# Patient Record
Sex: FEMALE | Race: WHITE
[De-identification: names, ages, dates, MRNs, and addresses within clinical notes are randomized per-mention and may not be internally consistent; named-entity substitution may affect disease eponyms.]

---

## 2019-03-05 ENCOUNTER — HOSPITAL ENCOUNTER (EMERGENCY)
Dept: HOSPITAL 92 - ERS | Age: 19
Discharge: HOME | End: 2019-03-05
Payer: SELF-PAY

## 2019-03-05 DIAGNOSIS — N94.6: Primary | ICD-10-CM

## 2019-03-05 DIAGNOSIS — F17.210: ICD-10-CM

## 2019-03-05 DIAGNOSIS — F41.9: ICD-10-CM

## 2019-03-05 LAB
BASOPHILS # BLD AUTO: 0.1 THOU/UL (ref 0–0.2)
BASOPHILS NFR BLD AUTO: 1.2 % (ref 0–1)
EOSINOPHIL # BLD AUTO: 0.1 THOU/UL (ref 0–0.7)
EOSINOPHIL NFR BLD AUTO: 1.5 % (ref 0–10)
HGB BLD-MCNC: 16.6 G/DL (ref 12–16)
LYMPHOCYTES # BLD: 2.6 THOU/UL (ref 1.2–3.4)
LYMPHOCYTES NFR BLD AUTO: 32.6 % (ref 28–48)
MCH RBC QN AUTO: 32.8 PG (ref 25–35)
MCV RBC AUTO: 95.4 FL (ref 78–98)
MONOCYTES # BLD AUTO: 0.6 THOU/UL (ref 0.11–0.59)
MONOCYTES NFR BLD AUTO: 7.1 % (ref 0–4)
NEUTROPHILS # BLD AUTO: 4.6 THOU/UL (ref 1.4–6.5)
NEUTROPHILS NFR BLD AUTO: 57.7 % (ref 31–61)
PLATELET # BLD AUTO: 309 THOU/UL (ref 130–400)
RBC # BLD AUTO: 5.06 MILL/UL (ref 4–5.2)
WBC # BLD AUTO: 7.9 THOU/UL (ref 4.8–10.8)

## 2019-03-05 PROCEDURE — 36415 COLL VENOUS BLD VENIPUNCTURE: CPT

## 2019-03-05 PROCEDURE — 84702 CHORIONIC GONADOTROPIN TEST: CPT

## 2019-03-05 PROCEDURE — 85025 COMPLETE CBC W/AUTO DIFF WBC: CPT

## 2019-03-05 PROCEDURE — 96372 THER/PROPH/DIAG INJ SC/IM: CPT

## 2019-10-15 ENCOUNTER — HOSPITAL ENCOUNTER (EMERGENCY)
Dept: HOSPITAL 92 - ERS | Age: 19
Discharge: HOME | End: 2019-10-15
Payer: SELF-PAY

## 2019-10-15 DIAGNOSIS — F41.9: ICD-10-CM

## 2019-10-15 DIAGNOSIS — L03.113: Primary | ICD-10-CM

## 2019-10-15 DIAGNOSIS — Z85.028: ICD-10-CM

## 2019-10-15 DIAGNOSIS — K21.9: ICD-10-CM

## 2019-10-15 DIAGNOSIS — F17.210: ICD-10-CM

## 2019-10-15 PROCEDURE — 99282 EMERGENCY DEPT VISIT SF MDM: CPT

## 2019-12-30 ENCOUNTER — HOSPITAL ENCOUNTER (EMERGENCY)
Dept: HOSPITAL 92 - ERS | Age: 19
Discharge: HOME | End: 2019-12-30
Payer: COMMERCIAL

## 2019-12-30 DIAGNOSIS — Z3A.01: ICD-10-CM

## 2019-12-30 DIAGNOSIS — F41.9: ICD-10-CM

## 2019-12-30 DIAGNOSIS — F17.210: ICD-10-CM

## 2019-12-30 DIAGNOSIS — O99.331: ICD-10-CM

## 2019-12-30 DIAGNOSIS — F15.10: ICD-10-CM

## 2019-12-30 DIAGNOSIS — O99.341: ICD-10-CM

## 2019-12-30 DIAGNOSIS — O23.41: Primary | ICD-10-CM

## 2019-12-30 LAB
ALBUMIN SERPL BCG-MCNC: 4.7 G/DL (ref 3.5–5)
ALP SERPL-CCNC: 73 U/L (ref 40–100)
ALT SERPL W P-5'-P-CCNC: 14 U/L (ref 8–55)
ANION GAP SERPL CALC-SCNC: 13 MMOL/L (ref 10–20)
APAP SERPL-MCNC: (no result) MCG/ML (ref 10–30)
AST SERPL-CCNC: 14 U/L (ref 5–34)
BACTERIA UR QL AUTO: (no result) HPF
BASOPHILS # BLD AUTO: 0.1 THOU/UL (ref 0–0.2)
BASOPHILS NFR BLD AUTO: 0.7 % (ref 0–1)
BILIRUB SERPL-MCNC: 0.4 MG/DL (ref 0.2–1.2)
BUN SERPL-MCNC: 8 MG/DL (ref 7–18.7)
CALCIUM SERPL-MCNC: 9.6 MG/DL (ref 7.8–10.44)
CHLORIDE SERPL-SCNC: 103 MMOL/L (ref 98–107)
CK SERPL-CCNC: 90 U/L (ref 29–168)
CO2 SERPL-SCNC: 24 MMOL/L (ref 22–29)
CREAT CL PREDICTED SERPL C-G-VRATE: 0 ML/MIN (ref 70–130)
DRUG SCREEN CUTOFF: (no result)
EOSINOPHIL # BLD AUTO: 0 THOU/UL (ref 0–0.7)
EOSINOPHIL NFR BLD AUTO: 0.1 % (ref 0–10)
GLOBULIN SER CALC-MCNC: 3.1 G/DL (ref 2.4–3.5)
GLUCOSE SERPL-MCNC: 108 MG/DL (ref 70–105)
GLUCOSE UR STRIP-MCNC: 300 MG/DL
HCG SERPL QL: POSITIVE
HGB BLD-MCNC: 16.1 G/DL (ref 12–16)
LEUKOCYTE ESTERASE UR QL STRIP.AUTO: 75 LEU/UL
LYMPHOCYTES # BLD: 1.6 THOU/UL (ref 1.2–3.4)
LYMPHOCYTES NFR BLD AUTO: 10.6 % (ref 28–48)
MCH RBC QN AUTO: 33 PG (ref 25–35)
MCV RBC AUTO: 93.7 FL (ref 78–98)
MEDTOX CONTROL LINE VALID?: (no result)
MEDTOX READER #: (no result)
MONOCYTES # BLD AUTO: 0.9 THOU/UL (ref 0.11–0.59)
MONOCYTES NFR BLD AUTO: 6.1 % (ref 0–4)
NEUTROPHILS # BLD AUTO: 12.5 THOU/UL (ref 1.4–6.5)
NEUTROPHILS NFR BLD AUTO: 82.5 % (ref 31–61)
PLATELET # BLD AUTO: 312 THOU/UL (ref 130–400)
POTASSIUM SERPL-SCNC: 3.6 MMOL/L (ref 3.5–5.1)
PREGS CONTROL BACKGROUND?: (no result)
PREGS CONTROL BAR APPEAR?: YES
PROT UR STRIP.AUTO-MCNC: 10 MG/DL
RBC # BLD AUTO: 4.87 MILL/UL (ref 4–5.2)
RBC UR QL AUTO: (no result) HPF (ref 0–3)
SALICYLATES SERPL-MCNC: (no result) MG/DL (ref 15–30)
SODIUM SERPL-SCNC: 136 MMOL/L (ref 136–145)
WBC # BLD AUTO: 15.2 THOU/UL (ref 4.8–10.8)

## 2019-12-30 PROCEDURE — 81015 MICROSCOPIC EXAM OF URINE: CPT

## 2019-12-30 PROCEDURE — 80306 DRUG TEST PRSMV INSTRMNT: CPT

## 2019-12-30 PROCEDURE — 81003 URINALYSIS AUTO W/O SCOPE: CPT

## 2019-12-30 PROCEDURE — 80053 COMPREHEN METABOLIC PANEL: CPT

## 2019-12-30 PROCEDURE — 96361 HYDRATE IV INFUSION ADD-ON: CPT

## 2019-12-30 PROCEDURE — 84484 ASSAY OF TROPONIN QUANT: CPT

## 2019-12-30 PROCEDURE — 84703 CHORIONIC GONADOTROPIN ASSAY: CPT

## 2019-12-30 PROCEDURE — 84443 ASSAY THYROID STIM HORMONE: CPT

## 2019-12-30 PROCEDURE — 87077 CULTURE AEROBIC IDENTIFY: CPT

## 2019-12-30 PROCEDURE — 80307 DRUG TEST PRSMV CHEM ANLYZR: CPT

## 2019-12-30 PROCEDURE — 87086 URINE CULTURE/COLONY COUNT: CPT

## 2019-12-30 PROCEDURE — 93005 ELECTROCARDIOGRAM TRACING: CPT

## 2019-12-30 PROCEDURE — 82550 ASSAY OF CK (CPK): CPT

## 2019-12-30 PROCEDURE — 87186 SC STD MICRODIL/AGAR DIL: CPT

## 2019-12-30 PROCEDURE — 96360 HYDRATION IV INFUSION INIT: CPT

## 2019-12-30 PROCEDURE — 85025 COMPLETE CBC W/AUTO DIFF WBC: CPT

## 2020-02-01 ENCOUNTER — HOSPITAL ENCOUNTER (EMERGENCY)
Dept: HOSPITAL 92 - ERS | Age: 20
Discharge: HOME | End: 2020-02-01
Payer: SELF-PAY

## 2020-02-01 DIAGNOSIS — Z3A.09: ICD-10-CM
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
## 2020-02-01 DIAGNOSIS — F17.210: ICD-10-CM

## 2020-02-01 DIAGNOSIS — O99.341: ICD-10-CM

## 2020-02-01 DIAGNOSIS — O99.331: ICD-10-CM

## 2020-02-01 DIAGNOSIS — O9A.311: Primary | ICD-10-CM

## 2020-02-01 DIAGNOSIS — F41.9: ICD-10-CM

## 2020-02-01 LAB
ALBUMIN SERPL BCG-MCNC: 3.8 G/DL (ref 3.5–5)
ALP SERPL-CCNC: 50 U/L (ref 40–100)
ALT SERPL W P-5'-P-CCNC: 16 U/L (ref 8–55)
ANION GAP SERPL CALC-SCNC: 14 MMOL/L (ref 10–20)
AST SERPL-CCNC: 16 U/L (ref 5–34)
BACTERIA UR QL AUTO: (no result) HPF
BASOPHILS # BLD AUTO: 0 THOU/UL (ref 0–0.2)
BASOPHILS NFR BLD AUTO: 0 % (ref 0–1)
BILIRUB SERPL-MCNC: 1.2 MG/DL (ref 0.2–1.2)
BUN SERPL-MCNC: 6 MG/DL (ref 7–18.7)
CALCIUM SERPL-MCNC: 8.7 MG/DL (ref 7.8–10.44)
CHLORIDE SERPL-SCNC: 103 MMOL/L (ref 98–107)
CO2 SERPL-SCNC: 21 MMOL/L (ref 22–29)
CREAT CL PREDICTED SERPL C-G-VRATE: 0 ML/MIN (ref 70–130)
DRUG SCREEN CUTOFF: (no result)
EOSINOPHIL # BLD AUTO: 0 THOU/UL (ref 0–0.7)
EOSINOPHIL NFR BLD AUTO: 0.1 % (ref 0–10)
GLOBULIN SER CALC-MCNC: 2.8 G/DL (ref 2.4–3.5)
GLUCOSE SERPL-MCNC: 110 MG/DL (ref 70–105)
HGB BLD-MCNC: 13.6 G/DL (ref 12–16)
LEUKOCYTE ESTERASE UR QL STRIP.AUTO: 500 LEU/UL
LYMPHOCYTES # BLD: 0.4 THOU/UL (ref 1.2–3.4)
LYMPHOCYTES NFR BLD AUTO: 2.4 % (ref 28–48)
MCH RBC QN AUTO: 34 PG (ref 25–35)
MCV RBC AUTO: 95 FL (ref 78–98)
MEDTOX CONTROL LINE VALID?: (no result)
MEDTOX READER #: (no result)
MONOCYTES # BLD AUTO: 0.6 THOU/UL (ref 0.11–0.59)
MONOCYTES NFR BLD AUTO: 3.3 % (ref 0–4)
NEUTROPHILS # BLD AUTO: 16.5 THOU/UL (ref 1.4–6.5)
NEUTROPHILS NFR BLD AUTO: 94.2 % (ref 31–61)
PLATELET # BLD AUTO: 197 THOU/UL (ref 130–400)
POTASSIUM SERPL-SCNC: 3.6 MMOL/L (ref 3.5–5.1)
PROT UR STRIP.AUTO-MCNC: 30 MG/DL
RBC # BLD AUTO: 4.01 MILL/UL (ref 4–5.2)
SODIUM SERPL-SCNC: 134 MMOL/L (ref 136–145)
WBC # BLD AUTO: 17.5 THOU/UL (ref 4.8–10.8)

## 2020-02-01 PROCEDURE — 96361 HYDRATE IV INFUSION ADD-ON: CPT

## 2020-02-01 PROCEDURE — 87086 URINE CULTURE/COLONY COUNT: CPT

## 2020-02-01 PROCEDURE — 76856 US EXAM PELVIC COMPLETE: CPT

## 2020-02-01 PROCEDURE — 81003 URINALYSIS AUTO W/O SCOPE: CPT

## 2020-02-01 PROCEDURE — 71046 X-RAY EXAM CHEST 2 VIEWS: CPT

## 2020-02-01 PROCEDURE — 80053 COMPREHEN METABOLIC PANEL: CPT

## 2020-02-01 PROCEDURE — 84702 CHORIONIC GONADOTROPIN TEST: CPT

## 2020-02-01 PROCEDURE — 93005 ELECTROCARDIOGRAM TRACING: CPT

## 2020-02-01 PROCEDURE — 81015 MICROSCOPIC EXAM OF URINE: CPT

## 2020-02-01 PROCEDURE — 80306 DRUG TEST PRSMV INSTRMNT: CPT

## 2020-02-01 PROCEDURE — 85025 COMPLETE CBC W/AUTO DIFF WBC: CPT

## 2020-02-01 PROCEDURE — 36415 COLL VENOUS BLD VENIPUNCTURE: CPT

## 2020-02-01 PROCEDURE — 87186 SC STD MICRODIL/AGAR DIL: CPT

## 2020-02-01 PROCEDURE — 96374 THER/PROPH/DIAG INJ IV PUSH: CPT

## 2020-02-01 PROCEDURE — 87077 CULTURE AEROBIC IDENTIFY: CPT
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
## 2020-05-17 ENCOUNTER — HOSPITAL ENCOUNTER (INPATIENT)
Dept: HOSPITAL 92 - ERS | Age: 20
LOS: 1 days | Discharge: ANOTHER HOSPITAL | DRG: 832 | End: 2020-05-18
Attending: OBSTETRICS & GYNECOLOGY | Admitting: OBSTETRICS & GYNECOLOGY
Payer: COMMERCIAL

## 2020-05-17 VITALS — BODY MASS INDEX: 23.2 KG/M2

## 2020-05-17 DIAGNOSIS — O36.5920: ICD-10-CM

## 2020-05-17 DIAGNOSIS — O14.13: Primary | ICD-10-CM

## 2020-05-17 DIAGNOSIS — F15.90: ICD-10-CM

## 2020-05-17 DIAGNOSIS — O35.8XX0: ICD-10-CM

## 2020-05-17 DIAGNOSIS — Z3A.24: ICD-10-CM

## 2020-05-17 DIAGNOSIS — O99.322: ICD-10-CM

## 2020-05-17 DIAGNOSIS — O41.02X0: ICD-10-CM

## 2020-05-17 DIAGNOSIS — Z11.59: ICD-10-CM

## 2020-05-17 LAB
DRUG SCREEN CUTOFF: (no result)
LEUKOCYTE ESTERASE UR QL STRIP.AUTO: 75 LEU/UL
MEDTOX CONTROL LINE VALID?: (no result)
MEDTOX READER #: (no result)
PROT UR STRIP.AUTO-MCNC: 30 MG/DL
RBC UR QL AUTO: (no result) HPF (ref 0–3)
WBC UR QL AUTO: (no result) HPF (ref 0–3)

## 2020-05-17 PROCEDURE — 84484 ASSAY OF TROPONIN QUANT: CPT

## 2020-05-17 PROCEDURE — 87635 SARS-COV-2 COVID-19 AMP PRB: CPT

## 2020-05-17 PROCEDURE — 85379 FIBRIN DEGRADATION QUANT: CPT

## 2020-05-17 PROCEDURE — 87340 HEPATITIS B SURFACE AG IA: CPT

## 2020-05-17 PROCEDURE — 71045 X-RAY EXAM CHEST 1 VIEW: CPT

## 2020-05-17 PROCEDURE — 84156 ASSAY OF PROTEIN URINE: CPT

## 2020-05-17 PROCEDURE — 81003 URINALYSIS AUTO W/O SCOPE: CPT

## 2020-05-17 PROCEDURE — 85610 PROTHROMBIN TIME: CPT

## 2020-05-17 PROCEDURE — 80306 DRUG TEST PRSMV INSTRMNT: CPT

## 2020-05-17 PROCEDURE — 82550 ASSAY OF CK (CPK): CPT

## 2020-05-17 PROCEDURE — 86780 TREPONEMA PALLIDUM: CPT

## 2020-05-17 PROCEDURE — 82570 ASSAY OF URINE CREATININE: CPT

## 2020-05-17 PROCEDURE — 82553 CREATINE MB FRACTION: CPT

## 2020-05-17 PROCEDURE — 76805 OB US >/= 14 WKS SNGL FETUS: CPT

## 2020-05-17 PROCEDURE — 96375 TX/PRO/DX INJ NEW DRUG ADDON: CPT

## 2020-05-17 PROCEDURE — 36415 COLL VENOUS BLD VENIPUNCTURE: CPT

## 2020-05-17 PROCEDURE — U0003 INFECTIOUS AGENT DETECTION BY NUCLEIC ACID (DNA OR RNA); SEVERE ACUTE RESPIRATORY SYNDROME CORONAVIRUS 2 (SARS-COV-2) (CORONAVIRUS DISEASE [COVID-19]), AMPLIFIED PROBE TECHNIQUE, MAKING USE OF HIGH THROUGHPUT TECHNOLOGIES AS DESCRIBED BY CMS-2020-01-R: HCPCS

## 2020-05-17 PROCEDURE — 85025 COMPLETE CBC W/AUTO DIFF WBC: CPT

## 2020-05-17 PROCEDURE — 84112 EVAL AMNIOTIC FLUID PROTEIN: CPT

## 2020-05-17 PROCEDURE — 86850 RBC ANTIBODY SCREEN: CPT

## 2020-05-17 PROCEDURE — 81015 MICROSCOPIC EXAM OF URINE: CPT

## 2020-05-17 PROCEDURE — 96374 THER/PROPH/DIAG INJ IV PUSH: CPT

## 2020-05-17 PROCEDURE — 85730 THROMBOPLASTIN TIME PARTIAL: CPT

## 2020-05-17 PROCEDURE — 80053 COMPREHEN METABOLIC PANEL: CPT

## 2020-05-17 PROCEDURE — 86901 BLOOD TYPING SEROLOGIC RH(D): CPT

## 2020-05-17 PROCEDURE — 51702 INSERT TEMP BLADDER CATH: CPT

## 2020-05-17 PROCEDURE — 86900 BLOOD TYPING SEROLOGIC ABO: CPT

## 2020-05-17 PROCEDURE — 85027 COMPLETE CBC AUTOMATED: CPT

## 2020-05-17 PROCEDURE — 93005 ELECTROCARDIOGRAM TRACING: CPT

## 2020-05-18 VITALS — DIASTOLIC BLOOD PRESSURE: 114 MMHG | SYSTOLIC BLOOD PRESSURE: 168 MMHG

## 2020-05-18 LAB
ALBUMIN SERPL BCG-MCNC: 3.3 G/DL (ref 3.5–5)
ALP SERPL-CCNC: 74 U/L (ref 40–100)
ALT SERPL W P-5'-P-CCNC: 20 U/L (ref 8–55)
AMNISURE INTERNAL CONTROL QC: (no result)
ANION GAP SERPL CALC-SCNC: 13 MMOL/L (ref 10–20)
APTT PPP: 29 SEC (ref 22.9–36.1)
AST SERPL-CCNC: 19 U/L (ref 5–34)
BASOPHILS # BLD AUTO: 0.1 THOU/UL (ref 0–0.2)
BASOPHILS NFR BLD AUTO: 1 % (ref 0–1)
BILIRUB SERPL-MCNC: 0.3 MG/DL (ref 0.2–1.2)
BUN SERPL-MCNC: 12 MG/DL (ref 7–18.7)
CALCIUM SERPL-MCNC: 9.6 MG/DL (ref 7.8–10.44)
CHLORIDE SERPL-SCNC: 105 MMOL/L (ref 98–107)
CK MB SERPL-MCNC: 0.4 NG/ML (ref 0–6.6)
CK SERPL-CCNC: 22 U/L (ref 29–168)
CO2 SERPL-SCNC: 22 MMOL/L (ref 22–29)
CREAT CL PREDICTED SERPL C-G-VRATE: 0 ML/MIN (ref 70–130)
D DIMER PPP FEU-MCNC: 0.56 *MCG/ML (ref 0.27–0.43)
EOSINOPHIL # BLD AUTO: 0.1 THOU/UL (ref 0–0.7)
EOSINOPHIL NFR BLD AUTO: 0.5 % (ref 0–10)
GLOBULIN SER CALC-MCNC: 3.4 G/DL (ref 2.4–3.5)
GLUCOSE SERPL-MCNC: 81 MG/DL (ref 70–105)
HBSAG INDEX: 0.16 S/CO (ref 0–0.99)
HGB BLD-MCNC: 15.7 G/DL (ref 12–16)
HGB BLD-MCNC: 15.8 G/DL (ref 12–16)
INR PPP: 0.8
LYMPHOCYTES # BLD: 2 THOU/UL (ref 1.2–3.4)
LYMPHOCYTES NFR BLD AUTO: 18.2 % (ref 28–48)
MCH RBC QN AUTO: 33 PG (ref 25–35)
MCH RBC QN AUTO: 34.4 PG (ref 25–35)
MCV RBC AUTO: 95.7 FL (ref 78–98)
MCV RBC AUTO: 95.9 FL (ref 78–98)
MONOCYTES # BLD AUTO: 0.8 THOU/UL (ref 0.11–0.59)
MONOCYTES NFR BLD AUTO: 7.6 % (ref 0–4)
NEUTROPHILS # BLD AUTO: 7.8 THOU/UL (ref 1.4–6.5)
NEUTROPHILS NFR BLD AUTO: 72.7 % (ref 31–61)
PLATELET # BLD AUTO: 205 THOU/UL (ref 130–400)
PLATELET # BLD AUTO: 210 THOU/UL (ref 130–400)
POTASSIUM SERPL-SCNC: 4.1 MMOL/L (ref 3.5–5.1)
PROT UR-MCNC: 16 MG/DL (ref 1–14)
PROTHROMBIN TIME: 11.2 SEC (ref 12–14.7)
RBC # BLD AUTO: 4.57 MILL/UL (ref 4–5.2)
RBC # BLD AUTO: 4.78 MILL/UL (ref 4–5.2)
SODIUM SERPL-SCNC: 136 MMOL/L (ref 136–145)
SYPHILIS ANTIBODY INDEX: 0.04 S/CO
WBC # BLD AUTO: 10.8 THOU/UL (ref 4.8–10.8)
WBC # BLD AUTO: 12.2 THOU/UL (ref 4.8–10.8)

## 2020-05-18 NOTE — PRG
DATE OF SERVICE:  05/18/2020



TIME OF SERVICE:  07:30.



SUBJECTIVE:  Ms. Mtz has been resting comfortably through the night.  She required

a total of 15 mg of Apresoline to achieve persistent and sustained systolic blood

pressures below 160.  She states her chest pain is better, headache is better.  She

has some sweats from the magnesium sulfate.  She has a good urine output.  COVID-19

test is pending and anticipate resulting between 1100 hours and 1200 hours today. 



Protein creatinine ratio was 16/21 consistent with proteinuria.  Ultrasound revealed

a growth restricted fetus.  The patient has good gestational criteria for 24 weeks

and 4 days gestation.  Composite gestational age was 21 weeks 6 days with EFW of 388

g, SAVANNA of 2.4 cm and absent end-diastolic flow.  There was no reversal of

end-diastolic flow noted.  FHTs were 150s, cephalic presentation was noted.

AmniSure was negative.  This is a preliminary report based on ultrasound tech

biometrics and radiologist report is not available. 



IMPRESSION:  This is severe preeclampsia complicated with growth restriction and

umbilical artery dysfunction without evidence of acute fetal distress. 



PLAN:  Discussed the patient's care with on-call MFM at [facility].

He agrees with this plan.  We will continue magnesium sulfate and betamethasone per

protocol for severe preeclampsia and for prematurity.  Apresoline as needed.  Await

negative COVID-19 result which was performed solely because of the patient's

incarcerated status.  [facility] acknowledges that they will accept the patient with

negative COVID-19 status.  We will initiate this transfer with a transfer center at

[facility] and plan on air ambulance evacuation to [place] later this morning.  Dr. Kent will be taking over at 0800 and this plan of care will be discussed with

him. 







Job ID:  [id number]

## 2020-05-18 NOTE — PDOC.EVN
Event Note





- Event Note


Event Note: 





Ms. Mtz, who had been in the custody of [facility]'s office until 

now, has requested to leave against medical advice. She arrived with Blood 

pressures 180s systolic and has required multiple doses of hydralizine for 

control. She currently has a blood pressure 168/116 for which we have treated 

with hydralizine 5mg. We have had a very jesus discussion. She understands that 

her baby is extremely small for its age and that it has a placenta that is not 

working right.  She has expressed understanding that her blood pressures are 

high enough tot cause a hemorragic stroke which is bleeding in her brain.  She 

has expressed understanding that by leaving her life and her baby's life may be 

at risk.  It has been explained to her that our plan is to transfer her to 

[place], [place] where she will be able to receive better care should her baby 

require premature delivery. BAby is 24wks old, dated by a 9wk us and is 

measuring 21wks at this time with oligohydramnios and absent end diastolic flow.











5/18/20 11:05 am


Pt has decided to accept transfer to [place].  I have contacted the transfer 

center and am awaiting doctor to doctor discussion from [facility] at [facility] [place], [place].





5/18/20  1146AM


accepting physician Dr Caroline Mercer


accepting  [facility]


nurse report number [phone]


covid testing is neg

## 2020-05-18 NOTE — ULT
PRELIMINARY REPORT/[facility]/EMERGENCY AFTER HOURS PROCEDURE 

 

EXAM: 

US Obstetrical, Complete >14 weeks. 

 

CLINICAL HISTORY: 

HX: PRE-ECLAMPSIA AT 24WKS (BY 9WK SCAN). 

 

TECHNIQUE: 

Transabdominal imaging of the maternal pelvis and a > 14 week gestation with image documentation. 

 

COMPARISON: 

None provided. 

 

FINDINGS: 

FETUS: There is a single living intrauterine gestation, estimated gestational age 21 weeks 1 day 

POSITION: Fetal position is vertex. 

HEART RATE: The fetal heart rate is 140 beats per minute. 

BIOMETRICS: Based on composite fetal biometry, the estimated gestational age by ultrasound is 21 week
s 1 day corresponding to a due date of 9/27/2020.  The estimated fetal weight is 388 g. 

FETAL ANATOMIC SURVEY: The visualized fetal anatomy is unremarkable. 

PLACENTA: The placenta is located anterior. No sonographic evidence for previa or abruption.  Absent 
diastolic flow is noted in the umbilical cord. 

AMNIOTIC FLUID: Oligohydramnios is noted with an SAVANNA of 2.4 cm. 

CERVIX: Closed. Unremarkable as visualized.  Measures 2.5 cm 

 

IMPRESSION: 

Single viable intrauterine pregnancy.  Absent diastolic flow is noted in the umbilical cord, which is
 an indicator of placental insufficiency.  Oligohydramnios is noted

 

 

 

 

ELECTRONICALLY SIGNED BY:

Bjorn Galeana MD

May 18, 2020 4:06:47 AM CDT

 

This report is intended for review by the ordering physician only, in accordance of law. If you recei
ve this report in error, please call [facility] at [phone].

 

 

 

 

FINAL REPORT 

 

OB ULTRASOUND:

I agree with the preliminary report given by Dr. Bjorn Galeana of [facility]. 

 

Fetal measurements are as follows:

BPD   5.22 cm, 21 weeks 6 days

HC   18.90 cm, 21 weeks 1 day

AC   15.87 cm, 21 weeks 0 days

FL    3.41 cm, 20 weeks 5 days
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
## 2020-05-18 NOTE — HP
TIME OF DICTATION:  0300 hours.



TIME OF ADMISSION:  0200 hours.



REASON FOR ADMISSION:  A 24 weeks' and 4 days' gestation with severe range blood

pressures and probable severe preeclampsia. 



HISTORY OF PRESENT ILLNESS:  Ms. Mtz is a 19-year-old primigravida who is at 24

weeks and 4 days based on a 9-week and 4-day ultrasound on 02/01/2020.  She sees Dr. Gabe Wang at [facility].  Her pregnancy has been complicated by maternal

methamphetamine use.  The patient reports that last methamphetamine use was

approximately 2 weeks ago.  She has been incarcerated for the last 5 days.  She

states that over the past 2 weeks, she has had intermittent headaches with blurred

vision that have worsened.  She has also had intermittent mild midsternal pain that

does not radiate.  She denies bleeding.  She denies right upper quadrant pain. 



OB/GYN HISTORY:  As noted.  Antepartum record not available on the unit.



MEDICAL HISTORY:  History of ulcers in her teen years, has been several years since

she has had one. 



PAST SURGICAL HISTORY:  Denies.



ALLERGIES:  DENIES.



MEDICATIONS:  Prenatal vitamins.



SOCIAL HISTORY:  The patient admits methamphetamine use up until 2 weeks ago.



FAMILY HISTORY:  Noncontributory.



REVIEW OF SYSTEMS:  Noncontributory.



PHYSICAL EXAMINATION:

GENERAL:  White female, resting comfortably.  Does not seem to be in extreme

distress. 

VITAL SIGNS:  Pulse is 101, respirations 18, temperature 98.4, blood pressure

systolic 160s to 180s over 98 to 104. 

HEENT:  Within normal limits. 

LUNGS:  Clear to auscultation bilaterally. 

HEART:  Regular rhythm. 

ABDOMEN:  Soft, nontender.  Fundal height 24.  FHTs 140s. 

PELVIC:  Vulva without lesions.  Vaginal exam deferred. 

EXTREMITIES:  No clubbing, cyanosis, or edema.  1 to 2+ DTRs.



DIAGNOSTIC STUDIES:  The patient had an EKG, there was normal sinus rhythm in the

emergency room. 



LABORATORY DATA:  Include a hematocrit of 43%, platelet count of 205, normal white

count.  Creatinine of 0.74, ALT of 20.  The patient did have a mildly elevated

troponin at 0.053, which is indeterminate, but will be within normal limits for

preeclampsia.  Urinalysis revealed 30+ urine protein.  Drug screen was negative. 



IMPRESSION:  Probable severe preeclampsia at 24 to 25 weeks' gestation.  Pregnancy

complicated by maternal methamphetamine abuse and incarceration.  Coronavirus

disease-19 test pending secondary to incarceration status, but no signs or symptoms

consistent with active coronavirus disease-19 infection. 



PLAN:  Admission to Labor and Delivery.  Magnesium sulfate prophylaxis.

Betamethasone for fetal lung maturity.  Ultrasound for growth and fluid.  Urine

protein-to-creatinine ratio.  Await COVID-19 results.  We will check D-dimers, PT,

PTT, INR as these can have perturbations associated with COVID-19 infection.

Apresoline versus labetalol for elevated blood pressures.  The patient received

labetalol in the emergency room, and we will see if we get better response with

Apresoline on the labor and delivery unit.  The patient was in the emergency room

for approximately 2 to 3 hours prior to transfer to Labor and Delivery.  We will

stabilize the patient's blood pressures. 

Complete evaluation and will likely transfer via [facility] to [facility]

or [facility] for higher level of care due to extreme prematurity.

We will consent for delivery and proceed with delivery if signs or symptoms of fetal

distress are displayed; however, currently fetal heart rate tracing is in the 150s

to 160s.  No decelerations, no contractions noted. 





Job ID:  [id number]

## 2020-05-20 NOTE — EKG
Test Reason : EMERGENCY

Blood Pressure : ***/*** mmHG

Vent. Rate : 066 BPM     Atrial Rate : 066 BPM

   P-R Int : 112 ms          QRS Dur : 066 ms

    QT Int : 364 ms       P-R-T Axes : 000 038 049 degrees

   QTc Int : 381 ms

 

Normal sinus rhythm

Normal ECG

 

Confirmed by GOLDBERG M.D., ADRIANA ([id number]),  RUBEN GRACE ([id number]) on 5/20/2020 3:45:24 PM

 

Referred By:             Confirmed By:ADRIANA GOLDBERG M.D.

## 2020-12-03 ENCOUNTER — HOSPITAL ENCOUNTER (EMERGENCY)
Dept: HOSPITAL 92 - ERS | Age: 20
Discharge: HOME | End: 2020-12-03
Payer: COMMERCIAL

## 2020-12-03 DIAGNOSIS — R51.9: ICD-10-CM

## 2020-12-03 DIAGNOSIS — F17.210: ICD-10-CM

## 2020-12-03 DIAGNOSIS — I10: Primary | ICD-10-CM

## 2020-12-03 DIAGNOSIS — Z79.899: ICD-10-CM

## 2020-12-03 DIAGNOSIS — N39.0: ICD-10-CM

## 2020-12-03 LAB
ALBUMIN SERPL BCG-MCNC: 4.4 G/DL (ref 3.5–5)
ALP SERPL-CCNC: 91 U/L (ref 40–110)
ALT SERPL W P-5'-P-CCNC: 13 U/L (ref 8–55)
ANION GAP SERPL CALC-SCNC: 15 MMOL/L (ref 10–20)
AST SERPL-CCNC: 15 U/L (ref 5–34)
BACTERIA UR QL AUTO: (no result) HPF
BASOPHILS # BLD AUTO: 0.1 THOU/UL (ref 0–0.2)
BASOPHILS NFR BLD AUTO: 0.9 % (ref 0–1)
BILIRUB SERPL-MCNC: 0.9 MG/DL (ref 0.2–1.2)
BUN SERPL-MCNC: 23 MG/DL (ref 7–18.7)
CALCIUM SERPL-MCNC: 9.7 MG/DL (ref 7.8–10.44)
CHLORIDE SERPL-SCNC: 100 MMOL/L (ref 98–107)
CO2 SERPL-SCNC: 27 MMOL/L (ref 22–29)
CREAT CL PREDICTED SERPL C-G-VRATE: 0 ML/MIN (ref 70–130)
EOSINOPHIL # BLD AUTO: 0.1 THOU/UL (ref 0–0.7)
EOSINOPHIL NFR BLD AUTO: 0.9 % (ref 0–10)
GLOBULIN SER CALC-MCNC: 3.3 G/DL (ref 2.4–3.5)
GLUCOSE SERPL-MCNC: 147 MG/DL (ref 70–105)
GLUCOSE UR STRIP-MCNC: 50 MG/DL
HGB BLD-MCNC: 16.5 G/DL (ref 12–16)
LEUKOCYTE ESTERASE UR QL STRIP.AUTO: 250 LEU/UL
LYMPHOCYTES # BLD: 1.2 THOU/UL (ref 1.2–3.4)
LYMPHOCYTES NFR BLD AUTO: 15.3 % (ref 21–51)
MCH RBC QN AUTO: 31.4 PG (ref 27–31)
MCV RBC AUTO: 89.8 FL (ref 78–98)
MONOCYTES # BLD AUTO: 0.3 THOU/UL (ref 0.11–0.59)
MONOCYTES NFR BLD AUTO: 4 % (ref 0–10)
NEUTROPHILS # BLD AUTO: 5.9 THOU/UL (ref 1.4–6.5)
NEUTROPHILS NFR BLD AUTO: 78.8 % (ref 42–75)
PLATELET # BLD AUTO: 268 THOU/UL (ref 130–400)
POTASSIUM SERPL-SCNC: 4 MMOL/L (ref 3.5–5.1)
PREGU CONTROL BACKGROUND?: (no result)
PREGU CONTROL BAR APPEAR?: YES
PROT UR STRIP.AUTO-MCNC: 10 MG/DL
RBC # BLD AUTO: 5.27 MILL/UL (ref 4.2–5.4)
RBC UR QL AUTO: (no result) HPF (ref 0–3)
SODIUM SERPL-SCNC: 138 MMOL/L (ref 136–145)
SP GR UR STRIP: 1.02 (ref 1–1.04)
WBC # BLD AUTO: 7.5 THOU/UL (ref 4.8–10.8)
WBC UR QL AUTO: (no result) HPF (ref 0–3)

## 2020-12-03 PROCEDURE — 81025 URINE PREGNANCY TEST: CPT

## 2020-12-03 PROCEDURE — 94760 N-INVAS EAR/PLS OXIMETRY 1: CPT

## 2020-12-03 PROCEDURE — 96375 TX/PRO/DX INJ NEW DRUG ADDON: CPT

## 2020-12-03 PROCEDURE — 81015 MICROSCOPIC EXAM OF URINE: CPT

## 2020-12-03 PROCEDURE — 85025 COMPLETE CBC W/AUTO DIFF WBC: CPT

## 2020-12-03 PROCEDURE — 81003 URINALYSIS AUTO W/O SCOPE: CPT

## 2020-12-03 PROCEDURE — 36415 COLL VENOUS BLD VENIPUNCTURE: CPT

## 2020-12-03 PROCEDURE — 80053 COMPREHEN METABOLIC PANEL: CPT

## 2020-12-03 PROCEDURE — 96365 THER/PROPH/DIAG IV INF INIT: CPT

## 2020-12-03 PROCEDURE — 93005 ELECTROCARDIOGRAM TRACING: CPT

## 2023-01-27 ENCOUNTER — HOSPITAL ENCOUNTER (EMERGENCY)
Dept: HOSPITAL 92 - ERS | Age: 23
Discharge: HOME | End: 2023-01-27
Payer: SELF-PAY

## 2023-01-27 DIAGNOSIS — J02.8: Primary | ICD-10-CM

## 2023-01-27 DIAGNOSIS — F17.210: ICD-10-CM

## 2023-01-27 DIAGNOSIS — I10: ICD-10-CM

## 2023-01-27 PROCEDURE — 99283 EMERGENCY DEPT VISIT LOW MDM: CPT
